# Patient Record
Sex: FEMALE
[De-identification: names, ages, dates, MRNs, and addresses within clinical notes are randomized per-mention and may not be internally consistent; named-entity substitution may affect disease eponyms.]

---

## 2020-10-14 ENCOUNTER — TRANSCRIPTION ENCOUNTER (OUTPATIENT)
Age: 40
End: 2020-10-14

## 2020-11-19 ENCOUNTER — TRANSCRIPTION ENCOUNTER (OUTPATIENT)
Age: 40
End: 2020-11-19

## 2020-12-06 ENCOUNTER — TRANSCRIPTION ENCOUNTER (OUTPATIENT)
Age: 40
End: 2020-12-06

## 2024-06-19 ENCOUNTER — APPOINTMENT (OUTPATIENT)
Dept: OBGYN | Facility: CLINIC | Age: 44
End: 2024-06-19

## 2024-08-02 PROBLEM — Z00.00 ENCOUNTER FOR PREVENTIVE HEALTH EXAMINATION: Status: ACTIVE | Noted: 2024-08-02

## 2024-08-05 ENCOUNTER — APPOINTMENT (OUTPATIENT)
Dept: BREAST CENTER | Facility: CLINIC | Age: 44
End: 2024-08-05

## 2024-08-05 PROBLEM — R92.0 MICROCALCIFICATION OF BREAST: Status: ACTIVE | Noted: 2024-08-05

## 2024-08-05 PROCEDURE — 99204 OFFICE O/P NEW MOD 45 MIN: CPT | Mod: 25

## 2024-08-05 PROCEDURE — 76642 ULTRASOUND BREAST LIMITED: CPT | Mod: 50

## 2024-08-14 ENCOUNTER — RESULT REVIEW (OUTPATIENT)
Age: 44
End: 2024-08-14

## 2024-08-14 ENCOUNTER — APPOINTMENT (OUTPATIENT)
Dept: MAMMOGRAPHY | Facility: CLINIC | Age: 44
End: 2024-08-14
Payer: COMMERCIAL

## 2024-08-14 PROCEDURE — 19081 BX BREAST 1ST LESION STRTCTC: CPT | Mod: LT

## 2024-08-14 PROCEDURE — 88305 TISSUE EXAM BY PATHOLOGIST: CPT | Mod: 26

## 2024-08-14 PROCEDURE — 77065 DX MAMMO INCL CAD UNI: CPT | Mod: 26,LT

## 2024-08-14 NOTE — PROCEDURE
[FreeTextEntry3] : Bilateral breast ultrasound  The patient has prominent bilateral x-ray lymphadenopathy reported on outside imaging  Targeted exam of both axillas demonstrates axillary lymph nodes to demonstrate a normal fatty hilum  There is no suspicious lymphadenopathy

## 2024-08-14 NOTE — ASSESSMENT
[FreeTextEntry1] : No suspicious axillary lymphadenopathy  Indeterminate microcalcifications left breast  Patient assisted in scheduling stereotactic left breast biopsy  Further recommendations will be made pending the results of the biopsy  A total of 45 minutes was spent in consultation evaluation review

## 2024-08-14 NOTE — HISTORY OF PRESENT ILLNESS
[FreeTextEntry1] : Referred by Dr. Izzy Zhang PCP.  Patient recently had mammogram performed at [facility] and resulted a cluster of calcifications to the left breast and suggest a left breast biopsy.  Denies palpable mass, breast pain, nipple discharge, redness on the skin.  Mother at age 67 had breast cancer.  No genetic testing was performed.

## 2024-08-14 NOTE — HISTORY OF PRESENT ILLNESS
[FreeTextEntry1] : Referred by Dr. Izzy Zhang PCP.  Patient recently had mammogram performed at [facility] and resulted a cluster of calcifications to the left breast and suggest a left breast biopsy.  Denies palpable mass, breast pain, nipple discharge, redness on the skin.  Mother at age 67 had breast cancer.  No genetic testing was performed.

## 2024-08-15 ENCOUNTER — NON-APPOINTMENT (OUTPATIENT)
Age: 44
End: 2024-08-15

## 2025-02-24 ENCOUNTER — APPOINTMENT (OUTPATIENT)
Dept: BREAST CENTER | Facility: CLINIC | Age: 45
End: 2025-02-24